# Patient Record
Sex: FEMALE
[De-identification: names, ages, dates, MRNs, and addresses within clinical notes are randomized per-mention and may not be internally consistent; named-entity substitution may affect disease eponyms.]

---

## 2020-10-30 ENCOUNTER — TRANSCRIPTION ENCOUNTER (OUTPATIENT)
Age: 30
End: 2020-10-30

## 2021-07-17 ENCOUNTER — TRANSCRIPTION ENCOUNTER (OUTPATIENT)
Age: 31
End: 2021-07-17

## 2021-08-01 ENCOUNTER — TRANSCRIPTION ENCOUNTER (OUTPATIENT)
Age: 31
End: 2021-08-01

## 2023-03-01 DIAGNOSIS — Z83.6 FAMILY HISTORY OF OTHER DISEASES OF THE RESPIRATORY SYSTEM: ICD-10-CM

## 2023-03-01 PROBLEM — Z00.00 ENCOUNTER FOR PREVENTIVE HEALTH EXAMINATION: Status: ACTIVE | Noted: 2023-03-01

## 2023-03-02 ENCOUNTER — APPOINTMENT (OUTPATIENT)
Dept: PEDIATRIC ALLERGY IMMUNOLOGY | Facility: CLINIC | Age: 33
End: 2023-03-02
Payer: COMMERCIAL

## 2023-03-02 VITALS
HEIGHT: 64 IN | BODY MASS INDEX: 23.9 KG/M2 | HEART RATE: 88 BPM | WEIGHT: 140 LBS | DIASTOLIC BLOOD PRESSURE: 76 MMHG | TEMPERATURE: 97.7 F | SYSTOLIC BLOOD PRESSURE: 113 MMHG | OXYGEN SATURATION: 99 %

## 2023-03-02 DIAGNOSIS — H10.10 ACUTE ATOPIC CONJUNCTIVITIS, UNSPECIFIED EYE: ICD-10-CM

## 2023-03-02 PROCEDURE — 99214 OFFICE O/P EST MOD 30 MIN: CPT

## 2023-03-02 RX ORDER — OLOPATADINE HYDROCHLORIDE 665 UG/1
0.6 SPRAY, METERED NASAL TWICE DAILY
Qty: 3 | Refills: 3 | Status: ACTIVE | COMMUNITY
Start: 2023-03-02 | End: 1900-01-01

## 2023-03-02 RX ORDER — FLUTICASONE PROPIONATE 110 UG/1
110 AEROSOL, METERED RESPIRATORY (INHALATION) TWICE DAILY
Qty: 3 | Refills: 3 | Status: ACTIVE | COMMUNITY
Start: 2023-03-02 | End: 1900-01-01

## 2023-03-02 NOTE — SOCIAL HISTORY
[de-identified] : House with area rug in the bedroom, dust mite covers, 1 dog, no cigarette smoke exposure, duct less air conditioning, baseboard heating.

## 2023-03-02 NOTE — PHYSICAL EXAM
[Alert] : alert [No Acute Distress] : no acute distress [Normal Voice/Communication] : normal voice communication [Soft] : abdomen soft [Normal Cervical Lymph Nodes] : cervical [Skin Intact] : skin intact  [No clubbing] : no clubbing [No Cyanosis] : no cyanosis [Alert, Awake, Oriented as Age-Appropriate] : alert, awake, oriented as age appropriate [de-identified] : Mild erythema of the right eye. [de-identified] : Throat clear.  Nasal mucosa pink, mild stuffy nose, scant clear discharge.  Tympanic membrane dull on the right, normal on the left.  No sinus tenderness. [de-identified] : Chest clear, good air entry, no wheezing or crackles. [de-identified] : S1-S2 regular, no murmurs.

## 2023-03-02 NOTE — REVIEW OF SYSTEMS
[Eye Redness] : redness [Eye Itching] : itchy eyes [Nasal Congestion] : nasal congestion [Difficulty Breathing] : dyspnea [Nocturnal Awakening] : no nocturnal awakening with shortness of breath [Recurrent Sinus Infections] : no recurrent sinus infections [Recurrent Throat Infections] : no recurrence of throat infections [Recurrent Bronchitis] : no recurrent bronchitis [Recurrent Ear Infections] : no recurrence or ear infections [Recurrent Skin Infections] : no recurrent skin infections [Recurrent Pneumonia] : no ~T recurrent pneumonia

## 2023-03-02 NOTE — HISTORY OF PRESENT ILLNESS
[de-identified] : In office for follow-up for asthma and allergies. \bharti Followed in the office since 2011 for allergies.  She had sneezing, stuffy nose, itchy watery eyes on and off all year-round.  Skin testing in 2011 was positive for grass, weeds, mold, dust mites, and cat.\par Since 2018 she developed shortness of breath with exertion.  At the time she was diagnosed with exercise-induced asthma.  Spirometry in November 2019 was normal.  She has albuterol at hand, which she uses mostly with cat exposure.\bharti Currently has occasional stuffy nose, itchy eyes, and at times she has chest tightness, inability to take deep breaths.  Has no nocturnal chest symptoms.  Takes Xyzal 5 mg, montelukast 10 mg, Flonase Sensimist 1 spray per nostril once daily, and over-the-counter eyedrops when needed.  Felt gagging with Flonase, due to the smell.  Did not try to take albuterol for the chest discomfort.  Does not have nocturnal chest symptoms.  Feels dry mouth.\bharti Had COVID-19 in April 2022.  Did not require antibiotics for respiratory infections.\par History of large local reaction from insect stings in 2018 from yellow jacket and honeybee.  Carries EpiPen.  No sting in the last 12 months.\par Other medications: Oral contraceptives.

## 2023-03-02 NOTE — ASSESSMENT
[FreeTextEntry1] : Allergic rhinitis (dust mites, pollen, dander): Continue Flonase Sensimist 1 spray per nostril once or twice daily.  Trial of olopatadine nasal spray 1 spray twice a day.  May try to stop  Xyzal, see if the dry mouth improves.  Allergic conjunctivitis: Continue over-the-counter eyedrops as needed.\par Asthma mild intermittent: Continue montelukast 10 mg daily.  Use albuterol 2 puffs and Flovent 110, 2 puffs as needed up to every 4 hours.  If albuterol needed more than twice a week, use Flovent 110, 2 puffs twice daily.  Spirometry could not be performed, due to lack of equipment.\par Continue environmental control for dust mites and pollen.\par Large local reaction after venom exposure: EpiPen was refilled.\par Follow-up yearly.

## 2023-03-08 ENCOUNTER — NON-APPOINTMENT (OUTPATIENT)
Age: 33
End: 2023-03-08

## 2024-01-30 ENCOUNTER — OFFICE (OUTPATIENT)
Dept: URBAN - METROPOLITAN AREA CLINIC 113 | Facility: CLINIC | Age: 34
Setting detail: OPHTHALMOLOGY
End: 2024-01-30
Payer: COMMERCIAL

## 2024-01-30 DIAGNOSIS — H01.004: ICD-10-CM

## 2024-01-30 DIAGNOSIS — H16.223: ICD-10-CM

## 2024-01-30 DIAGNOSIS — H01.001: ICD-10-CM

## 2024-01-30 PROCEDURE — 99213 OFFICE O/P EST LOW 20 MIN: CPT | Performed by: STUDENT IN AN ORGANIZED HEALTH CARE EDUCATION/TRAINING PROGRAM

## 2024-01-30 ASSESSMENT — CONFRONTATIONAL VISUAL FIELD TEST (CVF)
OS_FINDINGS: FULL
OD_FINDINGS: FULL

## 2024-01-30 ASSESSMENT — SUPERFICIAL PUNCTATE KERATITIS (SPK)
OS_SPK: T
OD_SPK: T

## 2024-01-30 ASSESSMENT — REFRACTION_AUTOREFRACTION
OS_SPHERE: PLANO
OS_CYLINDER: +0.50
OD_AXIS: 025
OS_AXIS: 018
OD_SPHERE: PLANO
OD_CYLINDER: +0.25

## 2024-01-30 ASSESSMENT — TEAR BREAK UP TIME (TBUT)
OD_TBUT: T
OS_TBUT: T

## 2024-01-30 ASSESSMENT — LID EXAM ASSESSMENTS
OS_BLEPHARITIS: LUL 1+
OD_BLEPHARITIS: RUL 1+

## 2024-02-22 RX ORDER — LEVOCETIRIZINE DIHYDROCHLORIDE 5 MG/1
5 TABLET ORAL DAILY
Qty: 90 | Refills: 3 | Status: ACTIVE | COMMUNITY
Start: 2023-03-01 | End: 1900-01-01

## 2024-02-22 RX ORDER — MONTELUKAST 10 MG/1
10 TABLET, FILM COATED ORAL DAILY
Qty: 90 | Refills: 3 | Status: ACTIVE | COMMUNITY
Start: 2023-03-02 | End: 1900-01-01

## 2024-03-21 ENCOUNTER — NON-APPOINTMENT (OUTPATIENT)
Age: 34
End: 2024-03-21

## 2024-03-21 ENCOUNTER — APPOINTMENT (OUTPATIENT)
Dept: PEDIATRIC ALLERGY IMMUNOLOGY | Facility: CLINIC | Age: 34
End: 2024-03-21
Payer: COMMERCIAL

## 2024-03-21 VITALS
OXYGEN SATURATION: 100 % | SYSTOLIC BLOOD PRESSURE: 112 MMHG | HEART RATE: 80 BPM | DIASTOLIC BLOOD PRESSURE: 75 MMHG | TEMPERATURE: 98 F

## 2024-03-21 DIAGNOSIS — J30.89 OTHER ALLERGIC RHINITIS: ICD-10-CM

## 2024-03-21 DIAGNOSIS — J45.990 EXERCISE INDUCED BRONCHOSPASM: ICD-10-CM

## 2024-03-21 DIAGNOSIS — J45.20 MILD INTERMITTENT ASTHMA, UNCOMPLICATED: ICD-10-CM

## 2024-03-21 DIAGNOSIS — Z91.030 BEE ALLERGY STATUS: ICD-10-CM

## 2024-03-21 PROCEDURE — 99214 OFFICE O/P EST MOD 30 MIN: CPT | Mod: 25

## 2024-03-21 PROCEDURE — 94010 BREATHING CAPACITY TEST: CPT

## 2024-03-21 RX ORDER — ALBUTEROL SULFATE 90 UG/1
108 (90 BASE) INHALANT RESPIRATORY (INHALATION)
Qty: 3 | Refills: 3 | Status: ACTIVE | COMMUNITY
Start: 2023-03-02 | End: 1900-01-01

## 2024-03-21 RX ORDER — EPINEPHRINE 0.3 MG/.3ML
0.3 INJECTION INTRAMUSCULAR
Qty: 4 | Refills: 2 | Status: ACTIVE | COMMUNITY
Start: 2023-03-02 | End: 1900-01-01

## 2024-03-21 RX ORDER — FEXOFENADINE HYDROCHLORIDE 180 MG/1
180 TABLET ORAL DAILY
Qty: 90 | Refills: 3 | Status: ACTIVE | COMMUNITY
Start: 2024-03-21 | End: 1900-01-01

## 2024-03-21 NOTE — SOCIAL HISTORY
[de-identified] : House with area rug in the bedroom, dust mite covers, 1 dog, no cigarette smoke exposure, duct less air conditioning, baseboard heating.

## 2024-03-21 NOTE — ASSESSMENT
[FreeTextEntry1] : Allergic rhinitis: From pollen, mold, dust mites.  Switch levocetirizine to fexofenadine 180 mg.  Continue Flonase Sensimist, use 1 to 2 sprays per nostril once or twice daily.  Discontinue montelukast.  It did not help. Asthma mild intermittent with exercise-induced component: Spirometry was excellent today.  Albuterol as needed and before exertion. History of large local reaction to bee sting: EpiPen was renewed.  Use for more severe reaction. Follow-up yearly.

## 2024-03-21 NOTE — REVIEW OF SYSTEMS
[Post Nasal Drip] : post nasal drip [SOB with Exertion] : dyspnea on exertion [SOB at Rest] : no shortness of breath at rest [Nocturnal Awakening] : no nocturnal awakening with shortness of breath [Recurrent Sinus Infections] : no recurrent sinus infections [Recurrent Throat Infections] : no recurrence of throat infections [Recurrent Bronchitis] : no recurrent bronchitis [Recurrent Skin Infections] : no recurrent skin infections [Recurrent Ear Infections] : no recurrence or ear infections [Recurrent Pneumonia] : no ~T recurrent pneumonia

## 2024-03-21 NOTE — REASON FOR VISIT
[Routine Follow-Up] : a routine follow-up visit for [Allergic Rhinitis] : allergic rhinitis [Asthma] : asthma [Spouse] : spouse

## 2024-03-21 NOTE — PHYSICAL EXAM
[Alert] : alert [No Acute Distress] : no acute distress [Normal Voice/Communication] : normal voice communication [Supple] : the neck was supple [Normal S1, S2] : normal S1 and S2 [Regular Rhythm] : with a regular rhythm [Soft] : abdomen soft [Normal Cervical Lymph Nodes] : cervical [Skin Intact] : skin intact  [No Rash] : no rash [No clubbing] : no clubbing [No Cyanosis] : no cyanosis [Alert, Awake, Oriented as Age-Appropriate] : alert, awake, oriented as age appropriate [de-identified] : Eyes clear. [de-identified] : Throat clear, mouth is not dry.  Nasal mucosa pink, mild stuffy nose, scant clear discharge.  Tympanic membranes normal.  No sinus tenderness. [de-identified] : Chest clear, good air entry, no wheezing or crackles.

## 2024-10-03 DIAGNOSIS — Z23 ENCOUNTER FOR IMMUNIZATION: ICD-10-CM

## 2025-04-21 ENCOUNTER — APPOINTMENT (OUTPATIENT)
Dept: ORTHOPEDIC SURGERY | Facility: CLINIC | Age: 35
End: 2025-04-21

## 2025-04-23 ENCOUNTER — APPOINTMENT (OUTPATIENT)
Dept: ORTHOPEDIC SURGERY | Facility: CLINIC | Age: 35
End: 2025-04-23
Payer: COMMERCIAL

## 2025-04-23 VITALS — HEIGHT: 64 IN | WEIGHT: 135 LBS | BODY MASS INDEX: 23.05 KG/M2
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
## 2025-04-23 DIAGNOSIS — S16.1XXA STRAIN OF MUSCLE, FASCIA AND TENDON AT NECK LEVEL, INITIAL ENCOUNTER: ICD-10-CM

## 2025-04-23 PROCEDURE — 99203 OFFICE O/P NEW LOW 30 MIN: CPT

## 2025-05-13 ENCOUNTER — APPOINTMENT (OUTPATIENT)
Dept: PEDIATRIC ALLERGY IMMUNOLOGY | Facility: CLINIC | Age: 35
End: 2025-05-13
Payer: COMMERCIAL

## 2025-05-13 VITALS — SYSTOLIC BLOOD PRESSURE: 104 MMHG | OXYGEN SATURATION: 100 % | DIASTOLIC BLOOD PRESSURE: 68 MMHG | HEART RATE: 76 BPM

## 2025-05-13 DIAGNOSIS — R09.82 POSTNASAL DRIP: ICD-10-CM

## 2025-05-13 DIAGNOSIS — J30.89 OTHER ALLERGIC RHINITIS: ICD-10-CM

## 2025-05-13 DIAGNOSIS — J30.1 ALLERGIC RHINITIS DUE TO POLLEN: ICD-10-CM

## 2025-05-13 DIAGNOSIS — J45.20 MILD INTERMITTENT ASTHMA, UNCOMPLICATED: ICD-10-CM

## 2025-05-13 DIAGNOSIS — Z87.898 PERSONAL HISTORY OF OTHER SPECIFIED CONDITIONS: ICD-10-CM

## 2025-05-13 DIAGNOSIS — Z91.030 BEE ALLERGY STATUS: ICD-10-CM

## 2025-05-13 PROCEDURE — 99214 OFFICE O/P EST MOD 30 MIN: CPT

## 2025-05-13 RX ORDER — IPRATROPIUM BROMIDE 42 UG/1
0.06 SPRAY, METERED NASAL 3 TIMES DAILY
Qty: 3 | Refills: 3 | Status: ACTIVE | COMMUNITY
Start: 2025-05-13 | End: 1900-01-01

## 2025-05-21 ENCOUNTER — OFFICE (OUTPATIENT)
Dept: URBAN - METROPOLITAN AREA CLINIC 113 | Facility: CLINIC | Age: 35
Setting detail: OPHTHALMOLOGY
End: 2025-05-21
Payer: COMMERCIAL

## 2025-05-21 DIAGNOSIS — H16.223: ICD-10-CM

## 2025-05-21 DIAGNOSIS — H01.004: ICD-10-CM

## 2025-05-21 DIAGNOSIS — H01.001: ICD-10-CM

## 2025-05-21 PROCEDURE — 92014 COMPRE OPH EXAM EST PT 1/>: CPT | Performed by: STUDENT IN AN ORGANIZED HEALTH CARE EDUCATION/TRAINING PROGRAM

## 2025-05-21 ASSESSMENT — TEAR BREAK UP TIME (TBUT)
OS_TBUT: T
OD_TBUT: T

## 2025-05-21 ASSESSMENT — VISUAL ACUITY
OD_BCVA: 20/20
OS_BCVA: 20/20

## 2025-05-21 ASSESSMENT — TONOMETRY
OS_IOP_MMHG: 16
OD_IOP_MMHG: 17

## 2025-05-21 ASSESSMENT — KERATOMETRY
OS_K1POWER_DIOPTERS: 42.25
OD_K1POWER_DIOPTERS: 42.00
OS_K2POWER_DIOPTERS: 43.00
OS_AXISANGLE_DEGREES: 094
OD_K2POWER_DIOPTERS: 42.50
OD_AXISANGLE_DEGREES: 100

## 2025-05-21 ASSESSMENT — SUPERFICIAL PUNCTATE KERATITIS (SPK)
OD_SPK: T
OS_SPK: T

## 2025-05-21 ASSESSMENT — LID EXAM ASSESSMENTS
OS_BLEPHARITIS: LUL 1+
OD_BLEPHARITIS: RUL 1+

## 2025-05-21 ASSESSMENT — REFRACTION_AUTOREFRACTION
OD_AXIS: 024
OS_SPHERE: PLANO
OS_AXIS: 014
OD_CYLINDER: -0.25
OS_CYLINDER: -0.50
OD_SPHERE: PLANO

## 2025-05-21 ASSESSMENT — CONFRONTATIONAL VISUAL FIELD TEST (CVF)
OD_FINDINGS: FULL
OS_FINDINGS: FULL

## 2025-05-22 ENCOUNTER — NON-APPOINTMENT (OUTPATIENT)
Age: 35
End: 2025-05-22

## 2025-05-27 ENCOUNTER — TRANSCRIPTION ENCOUNTER (OUTPATIENT)
Age: 35
End: 2025-05-27

## 2025-06-25 ENCOUNTER — NON-APPOINTMENT (OUTPATIENT)
Age: 35
End: 2025-06-25

## 2025-09-03 ENCOUNTER — NON-APPOINTMENT (OUTPATIENT)
Age: 35
End: 2025-09-03